# Patient Record
Sex: FEMALE | Race: BLACK OR AFRICAN AMERICAN
[De-identification: names, ages, dates, MRNs, and addresses within clinical notes are randomized per-mention and may not be internally consistent; named-entity substitution may affect disease eponyms.]

---

## 2018-10-29 NOTE — EMERGENCY ROOM REPORT
History of Present Illness


General


Chief Complaint:  Abdominal Pain


Source:  Patient





Present Illness


[facility]


This is a 28-year-old female with no past medical history.  She presents with 

chief complaint of abdominal pain.  Onset was yesterday.  Described as sharp 

and crampy.  Localized mostly to the upper abdomen and radiating to the 

suprapubic area.  Had vomiting and diarrhea yesterday.  Better now.  Because 

she still having intermittent sharp pain she was concerned.  No other 

complaint.  No urinary complaint.  No fever.


Allergies:  


Coded Allergies:  


     No Known Allergies (Verified  Allergy, Unknown, 9/21/07)





Patient History


Past Medical History:  none, see triage record, old chart reviewed


Past Surgical History:  none


Pertinent Family History:  none


Last Menstrual Period:  10/18


Pregnant Now:  No


Immunizations:  other


Reviewed Nursing Documentation:  PMH: Agreed; PSxH: Agreed





Nursing Documentation-PMH


Past Medical History:  No History, Except For


Hx Gastrointestinal Problems:  Yes - gallbladder removed





Review of Systems


Eye:  Denies: eye pain, blurred vision


ENT:  Denies: ear pain, nose congestion, throat swelling


Respiratory:  Denies: cough, shortness of breath


Cardiovascular:  Denies: chest pain, palpitations


Gastrointestinal:  Reports: abdominal pain, diarrhea, nausea, vomiting


Musculoskeletal:  Denies: back pain, joint pain


Skin:  Denies: rash


Neurological:  Denies: headache, numbness


Endocrine:  Denies: increased thirst, increased urine


Hematologic/Lymphatic:  Denies: easy bruising


All Other Systems:  negative except mentioned in HPI





Physical Exam





Vital Signs








  Date Time  Temp Pulse Resp B/P (MAP) Pulse Ox O2 Delivery O2 Flow Rate FiO2


 


10/29/18 19:17 98.1 98 18 110/67 98 Room Air  





vitals normal


Sp02 EP Interpretation:  reviewed, normal


General Appearance:  well appearing, no apparent distress, alert


Head:  normocephalic, atraumatic


Eyes:  bilateral eye PERRL, bilateral eye EOMI


ENT:  hearing grossly normal, normal pharynx


Neck:  full range of motion, supple, no meningismus


Respiratory:  chest non-tender, lungs clear, normal breath sounds


Cardiovascular #1:  regular rate, rhythm, no murmur


Gastrointestinal:  normal bowel sounds, no mass, no organomegaly, no bruit, non-

distended, tenderness - Mild upper quadrant area


Musculoskeletal:  back normal, gait/station normal, normal range of motion


Psychiatric:  mood/affect normal


Skin:  warm/dry





Medical Decision Making


Diagnostic Impression:  


 Primary Impression:  


 Abdominal pain


 Qualified Codes:  R10.84 - Generalized abdominal pain


 Additional Impression:  


 Nausea vomiting and diarrhea


ER Course


Patient with abdominal pain.  Most likely gastroenteritis from a viral 

etiology.  She looks well.  Better now.  We'll discharge home.  No evidence of 

an acute abdomen or appendicitis.  No evidence of urinary tract infection.


Lab Results Impression


labs normal





Last Vital Signs








  Date Time  Temp Pulse Resp B/P (MAP) Pulse Ox O2 Delivery O2 Flow Rate FiO2


 


10/29/18 19:17 98.1 98 18 110/67 98 Room Air  








Status:  improved


Disposition:  HOME, SELF-CARE


Condition:  Stable


Patient Instructions:  Abdominal Pain, Adult





Additional Instructions:  


Follow-up with your Dr. in 2-3 days.  Return if symptom worsen.











Bhavin Dickens MD Oct 29, 2018 19:45

## 2020-03-02 ENCOUNTER — HOSPITAL ENCOUNTER (EMERGENCY)
Dept: HOSPITAL 72 - EMR | Age: 30
Discharge: HOME | End: 2020-03-02
Payer: SELF-PAY

## 2020-03-02 VITALS — DIASTOLIC BLOOD PRESSURE: 75 MMHG | SYSTOLIC BLOOD PRESSURE: 114 MMHG

## 2020-03-02 VITALS — WEIGHT: 135 LBS | HEIGHT: 64 IN | BODY MASS INDEX: 23.05 KG/M2

## 2020-03-02 VITALS — SYSTOLIC BLOOD PRESSURE: 114 MMHG | DIASTOLIC BLOOD PRESSURE: 75 MMHG

## 2020-03-02 DIAGNOSIS — Z90.49: ICD-10-CM

## 2020-03-02 DIAGNOSIS — J04.0: Primary | ICD-10-CM

## 2020-03-02 PROCEDURE — 99282 EMERGENCY DEPT VISIT SF MDM: CPT

## 2020-03-02 PROCEDURE — 87070 CULTURE OTHR SPECIMN AEROBIC: CPT

## 2020-03-02 NOTE — NUR
ED Nurse Note:

Pt ambulated to ED from home c/o sore throat, dry cough, pain with swallowing 
and loss of voice x4 days. Pt denies expectorating mucus or fever at home. VSS, 
Pt is A&OX4

## 2023-03-30 NOTE — EMERGENCY ROOM REPORT
History of Present Illness


General


Chief Complaint:  Sore Throat


Source:  Patient





Present Illness


[facility]


This a 29-year-old female with no past medical history patient presents with 

chief complaint of sore throat and hoarseness.  Onset for last 3 days.  No 

fever chills but no nausea no vomiting.  Slight cough.  Worse with swallowing.  

She is losing her voice.  Pain is 7 out of 10.  Over-the-counter medicine not 

helping.  Denies any other complaint.


Allergies:  


Coded Allergies:  


     No Known Allergies (Verified  Allergy, Unknown, 9/21/07)





Patient History


Past Medical History:  see triage record, old chart reviewed


Past Surgical History:  none


Pertinent Family History:  none


Social History:  Denies: smoking


Last Menstrual Period:  2/20/20


Pregnant Now:  No


Immunizations:  other


Reviewed Nursing Documentation:  PMH: Agreed; PSxH: Agreed





Nursing Documentation-PMH


Past Medical History:  No History, Except For


Hx Gastrointestinal Problems:  Yes - gallbladder removed





Review of Systems


Eye:  Denies: eye pain, blurred vision


ENT:  Reports: throat pain; Denies: ear pain, nose congestion, throat swelling


Respiratory:  Reports: cough; Denies: shortness of breath


Cardiovascular:  Denies: chest pain, palpitations


Gastrointestinal:  Denies: abdominal pain, diarrhea, nausea, vomiting


Musculoskeletal:  Denies: back pain, joint pain


Skin:  Denies: rash


Neurological:  Denies: headache, numbness


Endocrine:  Denies: increased thirst, increased urine


Hematologic/Lymphatic:  Denies: easy bruising


All Other Systems:  negative except mentioned in HPI





Physical Exam





Vital Signs








  Date Time  Temp Pulse Resp B/P (MAP) Pulse Ox O2 Delivery O2 Flow Rate FiO2


 


3/2/20 00:29 98.1 84 16 114/75 (88) 99 Room Air  





Vitals normal


Sp02 EP Interpretation:  reviewed, normal


General Appearance:  well appearing, no apparent distress, alert


Head:  normocephalic, atraumatic


Eyes:  bilateral eye PERRL, bilateral eye EOMI


ENT:  hearing grossly normal, normal pharynx


Neck:  full range of motion, supple, no meningismus


Respiratory:  chest non-tender, lungs clear, normal breath sounds


Cardiovascular #1:  regular rate, rhythm, no murmur


Gastrointestinal:  normal bowel sounds, non tender, no mass, no organomegaly, 

no bruit, non-distended


Musculoskeletal:  back normal, normal range of motion, gait/station normal


Psychiatric:  mood/affect normal





Medical Decision Making


Diagnostic Impression:  


 Primary Impression:  


 Laryngitis, acute


ER Course


Patient presents with symptom consistent with a viral illness.  She has no 

exudative tonsillitis.  No evidence of any trismus.  No evidence of 

peritonsillar abscess, retropharyngeal abscess or B[place]on angina.  Throat 

culture sent.  I see no need for antibiotics unless throat culture was positive.





Last Vital Signs








  Date Time  Temp Pulse Resp B/P (MAP) Pulse Ox O2 Delivery O2 Flow Rate FiO2


 


3/2/20 00:39 98.1  16 114/75 99 Room Air  


 


3/2/20 00:29  84      








Status:  improved


Disposition:  HOME, SELF-CARE


Condition:  Stable


Scripts


Ibuprofen* (MOTRIN*) 600 Mg Tablet


600 MG ORAL THREE TIMES A DAY, #30 TAB 0 Refills


   Prov: Bhavin Dickens MD         3/2/20


Patient Instructions:  Laryngitis





Additional Instructions:  


Increase fluids.  Salt water gargle.  Decrease talking as much as possible.  

Follow-up your doctor in 7 days.  If throat culture is positive, will call in 

antibiotic prescription.  Return if worse.











Bhavin Dickens MD Mar 2, 2020 01:03 Celso Barragan Physician [facility]  [facility] [address]  Scheduled Appointment: 04/12/2023